# Patient Record
Sex: FEMALE | Race: BLACK OR AFRICAN AMERICAN | NOT HISPANIC OR LATINO | Employment: STUDENT | URBAN - METROPOLITAN AREA
[De-identification: names, ages, dates, MRNs, and addresses within clinical notes are randomized per-mention and may not be internally consistent; named-entity substitution may affect disease eponyms.]

---
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
## 2020-03-04 ENCOUNTER — OFFICE VISIT (OUTPATIENT)
Dept: CARDIOLOGY CLINIC | Facility: CLINIC | Age: 20
End: 2020-03-04
Payer: COMMERCIAL

## 2020-03-04 VITALS
SYSTOLIC BLOOD PRESSURE: 132 MMHG | DIASTOLIC BLOOD PRESSURE: 66 MMHG | OXYGEN SATURATION: 99 % | WEIGHT: 164 LBS | HEART RATE: 71 BPM | HEIGHT: 66 IN | BODY MASS INDEX: 26.36 KG/M2

## 2020-03-04 DIAGNOSIS — R07.2 PRECORDIAL CHEST PAIN: ICD-10-CM

## 2020-03-04 DIAGNOSIS — R00.2 PALPITATION: Primary | ICD-10-CM

## 2020-03-04 DIAGNOSIS — R06.00 DYSPNEA, UNSPECIFIED TYPE: ICD-10-CM

## 2020-03-04 PROCEDURE — 93000 ELECTROCARDIOGRAM COMPLETE: CPT | Performed by: INTERNAL MEDICINE

## 2020-03-04 PROCEDURE — 99204 OFFICE O/P NEW MOD 45 MIN: CPT | Performed by: INTERNAL MEDICINE

## 2020-03-04 PROCEDURE — 3008F BODY MASS INDEX DOCD: CPT | Performed by: INTERNAL MEDICINE

## 2020-03-04 PROCEDURE — 1036F TOBACCO NON-USER: CPT | Performed by: INTERNAL MEDICINE

## 2020-03-04 NOTE — PROGRESS NOTES
Cardiology Consultation     Mayda Sher  [id number]  2000  [facility] [address] [place] [address]    1  Palpitation  POCT ECG    Holter monitor - 24 hour    Echo complete with contrast if indicated    Stress test only, exercise   2  Dyspnea, unspecified type  POCT ECG    Holter monitor - 24 hour    Echo complete with contrast if indicated    Stress test only, exercise   3  Precordial chest pain  POCT ECG    Holter monitor - 24 hour    Echo complete with contrast if indicated    Stress test only, exercise       Chief Complaint:  Palpitation, chest pain, shortness of breath    HPI:  40-year-old female with asthma presented with complaints of palpitation, chest pain and shortness of breath  As per patient she was referred from Dr Kacey Avendaño but no referral noted  Patient is having intermittent palpitation associated with shortness of breath going on for almost a year  She has noted more palpitation during the time of stress  She denies any worsening of palpitation on exertion  Patient also has noted intermittent chest tightness going on for more than 6 months  She had episode of chest tightness before 2 weeks at rest which was constant and lasted for 1 day mostly at rest without associated shortness of breath or palpitation  No worsening of chest tightness on exertion  She complains of intermittent episode of shortness of breath more associated with palpitation without any associated wheezing and she describe as unable to take deep breaths  Patient denies fever, chills, diarrhea, or weight changes, orthopnea, leg edema or loss of consciousness  Patient did tell me that she was having palpitations since he was young and was told by pediatrician to keep a note of it but never required any intervention or medication    She denies any previous cardiac echocardiogram or stress test    Social History: Denies smoking, alcohol intake or illicit drug use  Family History:  No family history of coronary artery disease or sudden cardiac death  EKG today shows sinus rhythm, normal axis    No significant changes compared to EKG in June of 2018    Review of Systems:  Review of system negative except as mentioned above    Patient Active Problem List   Diagnosis    Chest pain    Extrinsic asthma    Acne    Mild intermittent asthma with acute exacerbation     Past Medical History:   Diagnosis Date    Allergic rhinitis     Last assessed: 11/11/14    Asthma     Intrinsic     Social History     Socioeconomic History    Marital status: Single     Spouse name: Not on file    Number of children: Not on file    Years of education: Currently in 12th grade    Highest education level: Not on file   Occupational History    Not on file   Social Needs    Financial resource strain: Not on file    Food insecurity:     Worry: Not on file     Inability: Not on file    Transportation needs:     Medical: Not on file     Non-medical: Not on file   Tobacco Use    Smoking status: Never Smoker    Smokeless tobacco: Never Used   Substance and Sexual Activity    Alcohol use: No    Drug use: No    Sexual activity: Never   Lifestyle    Physical activity:     Days per week: Not on file     Minutes per session: Not on file    Stress: Not on file   Relationships    Social connections:     Talks on phone: Not on file     Gets together: Not on file     Attends Taoism service: Not on file     Active member of club or organization: Not on file     Attends meetings of clubs or organizations: Not on file     Relationship status: Not on file    Intimate partner violence:     Fear of current or ex partner: Not on file     Emotionally abused: Not on file     Physically abused: Not on file     Forced sexual activity: Not on file   Other Topics Concern    Not on file   Social History Narrative    +smoke detectors    +carbon monoxide detectors    Pets: none    No tobacco exposure    No guns    Living w/parents      Family History   Problem Relation Age of Onset    Asthma Mother     Hypertension Mother    [facility] Migraines Mother     Asthma Father     Hypertension Father     Glaucoma Maternal Grandmother     Aneurysm Maternal Grandmother     Cataracts Maternal Grandmother         Acquired    Hypothyroidism Maternal Aunt     Addiction problem Neg Hx     Mental illness Neg Hx      Past Surgical History:   Procedure Laterality Date    NO PAST SURGERIES         Current Outpatient Medications:     albuterol (PROVENTIL HFA,VENTOLIN HFA) 90 mcg/act inhaler, Inhale 2 puffs every 6 (six) hours as needed for wheezing, Disp: , Rfl:     cetirizine (ZyrTEC) 10 mg tablet, Take 1 tablet (10 mg total) by mouth daily For nasal congestion, Disp: 30 tablet, Rfl: 2  Allergies   Allergen Reactions    Molds & Smuts Cough     Vitals:    03/04/20 1429   BP: 132/66   BP Location: Left arm   Patient Position: Sitting   Cuff Size: Standard   Pulse: 71   SpO2: 99%   Weight: 74 4 kg (164 lb)   Height: 5' 6" (1 676 m)       No imaging or labs available for review      Physical Exam:  General:   awake, alert and oriented x3, not in distress  Neck: supple, no JVD  Eyes: PERRL, conjunctiva normal  Lungs:  Bilateral air entry positive, no wheeze/rhonchi or crackle  Heart:  S1-S2 normal, no murmur  Abdomen:  Soft ,nondistended ,nontender, bowel sounds positive  Extremities:  No leg edema, no deformity, ROM normal  Neuro:  Moving all extremities, speech clear  Skin: warm, no rash    /66 (BP Location: Left arm, Patient Position: Sitting, Cuff Size: Standard)   Pulse 71   Ht 5' 6" (1 676 m)   Wt 74 4 kg (164 lb)   SpO2 99%   BMI 26 47 kg/m²       Cardiographics :  ECG:  Sinus rhythm, normal axis    Assessment:    1  Chest pain  Atypical in nature  EKG shows sinus rhythm, normal axis  No worsening of chest pain on exertion    2   Palpitation  Associated with feeling of shortness of breath  Patient denies previous syncope or dizziness    3  Shortness of breath    4  Asthma    Recommendations:  24 hour Holter monitoring to evaluate for arrhythmia  2D echocardiogram to evaluate for structural heart disease  Exercise treadmill stress test for further evaluation including evaluate for stress induced arrhythmia  Patient advised to continue her routine activities  Above all discussed with patient and mother in the clinic    Patient understands and agrees  Return to clinic in 6 months

## 2020-06-29 ENCOUNTER — TELEPHONE (OUTPATIENT)
Dept: CARDIOLOGY CLINIC | Facility: CLINIC | Age: 20
End: 2020-06-29

## 2020-07-02 ENCOUNTER — HOSPITAL ENCOUNTER (OUTPATIENT)
Dept: NON INVASIVE DIAGNOSTICS | Facility: CLINIC | Age: 20
Discharge: HOME | End: 2020-07-02
Payer: COMMERCIAL

## 2020-07-02 ENCOUNTER — TELEPHONE (OUTPATIENT)
Dept: CARDIOLOGY CLINIC | Facility: CLINIC | Age: 20
End: 2020-07-02

## 2020-07-02 DIAGNOSIS — R06.00 DYSPNEA, UNSPECIFIED TYPE: ICD-10-CM

## 2020-07-02 DIAGNOSIS — R00.2 PALPITATION: ICD-10-CM

## 2020-07-02 DIAGNOSIS — R07.2 PRECORDIAL CHEST PAIN: ICD-10-CM

## 2020-07-02 PROCEDURE — 93016 CV STRESS TEST SUPVJ ONLY: CPT | Performed by: INTERNAL MEDICINE

## 2020-07-02 PROCEDURE — 93226 XTRNL ECG REC<48 HR SCAN A/R: CPT

## 2020-07-02 PROCEDURE — 93306 TTE W/DOPPLER COMPLETE: CPT | Performed by: INTERNAL MEDICINE

## 2020-07-02 PROCEDURE — 93225 XTRNL ECG REC<48 HRS REC: CPT

## 2020-07-02 PROCEDURE — 93018 CV STRESS TEST I&R ONLY: CPT | Performed by: INTERNAL MEDICINE

## 2020-07-02 PROCEDURE — 93017 CV STRESS TEST TRACING ONLY: CPT

## 2020-07-02 PROCEDURE — 93306 TTE W/DOPPLER COMPLETE: CPT

## 2020-07-02 NOTE — TELEPHONE ENCOUNTER
----- Message from Arely Otto MD sent at 7/2/2020  3:18 PM EDT -----  Please call the patient and inform her that there is no significant finding on echo

## 2020-07-06 LAB
ARRHY DURING EX: NORMAL
CHEST PAIN STATEMENT: NORMAL
MAX DIASTOLIC BP: 60 MMHG
MAX HEART RATE: 184 BPM
MAX PREDICTED HEART RATE: 200 BPM
MAX. SYSTOLIC BP: 164 MMHG
PROTOCOL NAME: NORMAL
REASON FOR TERMINATION: NORMAL
TARGET HR FORMULA: NORMAL
TEST INDICATION: NORMAL
TIME IN EXERCISE PHASE: NORMAL

## 2020-07-06 PROCEDURE — 93227 XTRNL ECG REC<48 HR R&I: CPT | Performed by: INTERNAL MEDICINE

## 2020-07-08 ENCOUNTER — TELEPHONE (OUTPATIENT)
Dept: CARDIOLOGY CLINIC | Facility: CLINIC | Age: 20
End: 2020-07-08

## 2020-07-08 NOTE — TELEPHONE ENCOUNTER
Was able to reach patient at [phone]  Advised her that there were no significant findings on her stress,holter or echo  Pt verbally understood  Pt is to return in 6 mths

## 2020-07-08 NOTE — TELEPHONE ENCOUNTER
----- Message from Nikhil Ellington MD sent at 7/7/2020  5:30 PM EDT -----  I called patient to discuss test result but unable to reach our after multiple times  please call the patient and inform her that there is no significant finding on echo, stress test or Holter  Let me know if she has any other question    She should follow-up as per schedule

## 2020-07-28 ENCOUNTER — TELEPHONE (OUTPATIENT)
Dept: CARDIOLOGY CLINIC | Facility: CLINIC | Age: 20
End: 2020-07-28

## 2020-07-30 NOTE — TELEPHONE ENCOUNTER
Spoke with patient  She had a 24 hour heart monitor on and when she removed it on 07/03/2020, she had some marks where the pads were  They have since scabbed over and she is left with scars and some dark skin in the area  Pt is looking for advice for the haider   (Dr [facility] patient)

## 2020-07-31 NOTE — TELEPHONE ENCOUNTER
If it is itchy, could be related to possible allergy to the patches  She can try over-the-counter hydrocortisone  If no improvement, patient should have this looked at by her primary care physician

## 2020-08-27 ENCOUNTER — OFFICE VISIT (OUTPATIENT)
Dept: CARDIOLOGY CLINIC | Facility: CLINIC | Age: 20
End: 2020-08-27
Payer: COMMERCIAL

## 2020-08-27 VITALS
OXYGEN SATURATION: 99 % | TEMPERATURE: 97.4 F | BODY MASS INDEX: 25.88 KG/M2 | DIASTOLIC BLOOD PRESSURE: 70 MMHG | WEIGHT: 161 LBS | RESPIRATION RATE: 18 BRPM | SYSTOLIC BLOOD PRESSURE: 110 MMHG | HEIGHT: 66 IN | HEART RATE: 83 BPM

## 2020-08-27 DIAGNOSIS — R07.89 ATYPICAL CHEST PAIN: Primary | ICD-10-CM

## 2020-08-27 PROCEDURE — 99212 OFFICE O/P EST SF 10 MIN: CPT | Performed by: INTERNAL MEDICINE

## 2020-08-27 NOTE — PROGRESS NOTES
[facility] [place]  [id number] [facility] [place] [address]  Cardiology Follow Up    Fallon Lambert  2000  [id number]      1  Palpitation  TSH, 3rd generation with Free T4 reflex       Chief Complaint   Patient presents with    Follow-up     6 months        Interval History:   51-year-old female with asthma presented for follow-up and test result discussion  Patient was initially seen in Cardiology Clinic for evaluation of chest pain, palpitation and shortness of breath  Since last clinic visit patient shortness of breath and palpitation has resolved  Her chest pain are much better  She had an episode of atypical chest pain early this month at rest lasting few seconds without any associated symptoms and self-resolved  Patient denies chest pain or shortness of breath on exertion  Her chest pain are always at rest   She does exercise at home and denies any chest pain during exercise  Denies fever, chills, leg edema or loss of consciousness  Cardiac workup in July of 2020:  24 hour Holter monitoring - predominant sinus rhythm with rare supraventricular and rare ventricular ectopic activity  No significant arrhythmia finding    Echocardiogram:  EF 60% without regional wall motion abnormality  No evidence of structural heart disease    Exercise treadmill stress test:  Exercise time 8 minutes  METS achieved 10 1  Maximum predicted heart rate 92%  Stress EKG negative for ischemia    No exercise-induced arrhythmia       Review of Systems:  Review of system negative except as mentioned above    Patient Active Problem List   Diagnosis    Chest pain    Extrinsic asthma    Acne    Mild intermittent asthma with acute exacerbation     Past Medical History:   Diagnosis Date    Allergic rhinitis     Last assessed: 11/11/14    Asthma     Intrinsic     Social History     Socioeconomic History    Marital status: Single     Spouse name: Not on file    Number of children: Not on file    Years of education: Currently in 12th grade    Highest education level: Not on file   Occupational History    Not on file   Social Needs    Financial resource strain: Not on file    Food insecurity     Worry: Not on file     Inability: Not on file    Transportation needs     Medical: Not on file     Non-medical: Not on file   Tobacco Use    Smoking status: Never Smoker    Smokeless tobacco: Never Used   Substance and Sexual Activity    Alcohol use: No    Drug use: No    Sexual activity: Never   Lifestyle    Physical activity     Days per week: Not on file     Minutes per session: Not on file    Stress: Not on file   Relationships    Social connections     Talks on phone: Not on file     Gets together: Not on file     Attends Jain service: Not on file     Active member of club or organization: Not on file     Attends meetings of clubs or organizations: Not on file     Relationship status: Not on file    Intimate partner violence     Fear of current or ex partner: Not on file     Emotionally abused: Not on file     Physically abused: Not on file     Forced sexual activity: Not on file   Other Topics Concern    Not on file   Social History Narrative    +smoke detectors    +carbon monoxide detectors    Pets: none    No tobacco exposure    No guns    Living w/parents      Family History   Problem Relation Age of Onset    Asthma Mother     Hypertension Mother     Migraines Mother     Asthma Father     Hypertension Father     Glaucoma Maternal Grandmother     Aneurysm Maternal Grandmother     Cataracts Maternal Grandmother         Acquired    Hypothyroidism Maternal Aunt     Addiction problem Neg Hx     Mental illness Neg Hx      Past Surgical History:   Procedure Laterality Date    NO PAST SURGERIES         Current Outpatient Medications:     albuterol (PROVENTIL HFA,VENTOLIN HFA) 90 mcg/act inhaler, Inhale 2 puffs every 6 (six) hours as needed for wheezing, Disp: , Rfl:     cetirizine (ZyrTEC) 10 mg tablet, Take 1 tablet (10 mg total) by mouth daily For nasal congestion (Patient not taking: Reported on 8/27/2020), Disp: 30 tablet, Rfl: 2  Allergies   Allergen Reactions    Molds & Smuts Cough       Labs:  Hospital Outpatient Visit on 07/02/2020   Component Date Value    Protocol Name 07/02/2020 SETH     Time In Exercise Phase 07/02/2020 00:08:00     MAX  SYSTOLIC BP 05/62/5942 327     Max Diastolic Bp 06/74/6384 60     Max Heart Rate 07/02/2020 184     Max Predicted Heart Rate 07/02/2020 200     Reason for Termination 07/02/2020 Target Heart Rate Achieved     Test Indication 07/02/2020 DYSPNEA, PRECORDIAL PAIN     Target Hr Formular 07/02/2020 (220 - Age)*85%     Arrhy During Ex 07/02/2020 none     Chest Pain Statement 07/02/2020 none      Imaging: No results found  Physical Exam:  General:  moderate built, awake, alert and oriented x3, not in distress  Neck: supple, no JVD  Eyes: PERRL, conjunctiva normal  Lungs:  Bilateral air entry positive, no wheeze/rhonchi or crackle  Heart:  S1-S2 normal, no murmur  Abdomen:  Soft ,nondistended ,nontender, bowel sounds positive  Extremities:  No leg edema, no deformity, ROM normal  Neuro:  Moving all extremities, speech clear  Skin: warm, no rash    /70 (BP Location: Right arm, Patient Position: Sitting, Cuff Size: Standard)   Pulse 83   Temp (!) 97 4 °F (36 3 °C)   Resp 18   Ht 5' 6" (1 676 m)   Wt 73 kg (161 lb)   SpO2 99%   BMI 25 99 kg/m²         Assessment:    1  Chest pain  Non anginal in nature  Echo showed normal LVEF without regional wall motion abnormality  Stress exercise treadmill stress test negative for ischemic changes  No chest pain on exertion  2  Palpitations/shortness of breath  Better since last admission and has resolved    3  Asthma    Recommendations:  Clinically it appears that chest pain are noncardiac in nature  There is no significant finding on cardiac work workup     I would get TSH level  Patient to follow-up with primary care physician   Advised to continue exercise  Return to clinic in 6 months or early as needed  Above all discussed with patient    Patient understands and agrees

## 2020-12-15 ENCOUNTER — TRANSCRIBE ORDERS (OUTPATIENT)
Dept: ADMINISTRATIVE | Facility: HOSPITAL | Age: 20
End: 2020-12-15

## 2020-12-15 ENCOUNTER — LAB (OUTPATIENT)
Dept: LAB | Facility: HOSPITAL | Age: 20
End: 2020-12-15
Attending: INTERNAL MEDICINE
Payer: COMMERCIAL

## 2020-12-15 ENCOUNTER — TELEPHONE (OUTPATIENT)
Dept: CARDIOLOGY CLINIC | Facility: CLINIC | Age: 20
End: 2020-12-15

## 2020-12-15 DIAGNOSIS — R00.2 PALPITATIONS: Primary | ICD-10-CM

## 2020-12-15 DIAGNOSIS — R00.2 PALPITATIONS: ICD-10-CM

## 2020-12-15 LAB
ALBUMIN SERPL BCP-MCNC: 3.7 G/DL (ref 3.5–5)
ALP SERPL-CCNC: 48 U/L (ref 46–116)
ALT SERPL W P-5'-P-CCNC: 13 U/L (ref 12–78)
ANION GAP SERPL CALCULATED.3IONS-SCNC: 9 MMOL/L (ref 4–13)
AST SERPL W P-5'-P-CCNC: 16 U/L (ref 5–45)
BILIRUB SERPL-MCNC: 0.2 MG/DL (ref 0.2–1)
BUN SERPL-MCNC: 9 MG/DL (ref 5–25)
CALCIUM SERPL-MCNC: 8.7 MG/DL (ref 8.3–10.1)
CHLORIDE SERPL-SCNC: 105 MMOL/L (ref 100–108)
CHOLEST SERPL-MCNC: 125 MG/DL (ref 50–200)
CO2 SERPL-SCNC: 26 MMOL/L (ref 21–32)
CREAT SERPL-MCNC: 0.77 MG/DL (ref 0.6–1.3)
GFR SERPL CREATININE-BSD FRML MDRD: 129 ML/MIN/1.73SQ M
GLUCOSE P FAST SERPL-MCNC: 86 MG/DL (ref 65–99)
HDLC SERPL-MCNC: 55 MG/DL
LDLC SERPL CALC-MCNC: 59 MG/DL (ref 0–100)
NONHDLC SERPL-MCNC: 70 MG/DL
POTASSIUM SERPL-SCNC: 4.1 MMOL/L (ref 3.5–5.3)
PROT SERPL-MCNC: 7.7 G/DL (ref 6.4–8.2)
SODIUM SERPL-SCNC: 140 MMOL/L (ref 136–145)
TRIGL SERPL-MCNC: 57 MG/DL
TSH SERPL DL<=0.05 MIU/L-ACNC: 1.25 UIU/ML (ref 0.46–3.98)

## 2020-12-15 PROCEDURE — 80061 LIPID PANEL: CPT

## 2020-12-15 PROCEDURE — 80053 COMPREHEN METABOLIC PANEL: CPT

## 2020-12-15 PROCEDURE — 84443 ASSAY THYROID STIM HORMONE: CPT | Performed by: INTERNAL MEDICINE

## 2020-12-15 PROCEDURE — 36415 COLL VENOUS BLD VENIPUNCTURE: CPT

## 2021-03-16 ENCOUNTER — OFFICE VISIT (OUTPATIENT)
Dept: CARDIOLOGY CLINIC | Facility: CLINIC | Age: 21
End: 2021-03-16
Payer: COMMERCIAL

## 2021-03-16 VITALS
DIASTOLIC BLOOD PRESSURE: 68 MMHG | BODY MASS INDEX: 25.17 KG/M2 | HEIGHT: 66 IN | SYSTOLIC BLOOD PRESSURE: 110 MMHG | OXYGEN SATURATION: 98 % | HEART RATE: 77 BPM | WEIGHT: 156.6 LBS

## 2021-03-16 DIAGNOSIS — R00.2 PALPITATION: ICD-10-CM

## 2021-03-16 DIAGNOSIS — R07.89 CHEST PAIN, ATYPICAL: Primary | ICD-10-CM

## 2021-03-16 PROCEDURE — 99213 OFFICE O/P EST LOW 20 MIN: CPT | Performed by: INTERNAL MEDICINE

## 2021-03-16 RX ORDER — NAPROXEN 500 MG/1
500 TABLET ORAL 2 TIMES DAILY
COMMUNITY

## 2021-03-16 NOTE — PROGRESS NOTES
[facility] [place]  [id number] [place]  [place] [address]  Cardiology Follow Up    Collin Mention  2000  [id number]      1  Chest pain, atypical     2  Palpitation         Chief Complaint   Patient presents with    Follow-up     6 month follow up       Interval History:    51-year-old female  with asthma presented for cardiology follow-up     patient was seen in Cardiology Clinic for non anginal chest pain including palpitation   since last clinic visit patient is doing well  Her palpitation has resolved  She had few episodes of non anginal chest pain without any associated symptoms at rest lasting few seconds but better than before  She was diagnosed to have COVID-19 December 2020 which did not require any hospitalization or antibiotic treatment   she has recently started doing exercise which includes cardio and patient able to do without any chest pain or shortness of breath  she is currently not taking any medications   lipid panel from December 2020 reviewed    LDL 59    Review of Systems:   all review of system negative except as mentioned above    Patient Active Problem List   Diagnosis    Chest pain    Extrinsic asthma    Acne    Mild intermittent asthma with acute exacerbation     Past Medical History:   Diagnosis Date    Allergic rhinitis     Last assessed: 11/11/14    Asthma     Intrinsic     Social History     Socioeconomic History    Marital status: Single     Spouse name: Not on file    Number of children: Not on file    Years of education: Currently in 12th grade    Highest education level: Not on file   Occupational History    Not on file   Social Needs    Financial resource strain: Not on file    Food insecurity     Worry: Not on file     Inability: Not on file    Transportation needs     Medical: Not on file     Non-medical: Not on file   Tobacco Use    Smoking status: Never Smoker    Smokeless tobacco: Never Used   Substance and Sexual Activity    Alcohol use: No    Drug use: No    Sexual activity: Never   Lifestyle    Physical activity     Days per week: Not on file     Minutes per session: Not on file    Stress: Not on file   Relationships    Social connections     Talks on phone: Not on file     Gets together: Not on file     Attends Muslim service: Not on file     Active member of club or organization: Not on file     Attends meetings of clubs or organizations: Not on file     Relationship status: Not on file    Intimate partner violence     Fear of current or ex partner: Not on file     Emotionally abused: Not on file     Physically abused: Not on file     Forced sexual activity: Not on file   Other Topics Concern    Not on file   Social History Narrative    +smoke detectors    +carbon monoxide detectors    Pets: none    No tobacco exposure    No guns    Living w/parents      Family History   Problem Relation Age of Onset    Asthma Mother     Hypertension Mother     Migraines Mother     Asthma Father     Hypertension Father     Glaucoma Maternal Grandmother     Aneurysm Maternal Grandmother     Cataracts Maternal Grandmother         Acquired    Hypothyroidism Maternal Aunt     Addiction problem Neg Hx     Mental illness Neg Hx      Past Surgical History:   Procedure Laterality Date    NO PAST SURGERIES         Current Outpatient Medications:     albuterol (PROVENTIL HFA,VENTOLIN HFA) 90 mcg/act inhaler, Inhale 2 puffs every 6 (six) hours as needed for wheezing, Disp: , Rfl:     naproxen (NAPROSYN) 500 mg tablet, Take 500 mg by mouth 2 (two) times a day, Disp: , Rfl:     cetirizine (ZyrTEC) 10 mg tablet, Take 1 tablet (10 mg total) by mouth daily For nasal congestion (Patient not taking: Reported on 8/27/2020), Disp: 30 tablet, Rfl: 2  Allergies   Allergen Reactions    Molds & Smuts Cough       Labs:  Lab on 12/15/2020   Component Date Value    Sodium 12/15/2020 140     Potassium 12/15/2020 4 1     Chloride 12/15/2020 105     CO2 12/15/2020 26     ANION GAP 12/15/2020 9     BUN 12/15/2020 9     Creatinine 12/15/2020 0 77     Glucose, Fasting 12/15/2020 86     Calcium 12/15/2020 8 7     AST 12/15/2020 16     ALT 12/15/2020 13     Alkaline Phosphatase 12/15/2020 48     Total Protein 12/15/2020 7 7     Albumin 12/15/2020 3 7     Total Bilirubin 12/15/2020 0 20     eGFR 12/15/2020 129     Cholesterol 12/15/2020 125     Triglycerides 12/15/2020 57     HDL, Direct 12/15/2020 55     LDL Calculated 12/15/2020 59     Non-HDL-Chol (CHOL-HDL) 12/15/2020 70      Imaging: No results found  Physical Exam:  General:   average built, awake, alert and oriented x3, not in distress  Neck: supple, no JVD  Eyes: PERRL, conjunctiva normal  Lungs:  Bilateral air entry positive, no wheeze/rhonchi or crackle  Heart:  S1-S2 normal, no murmur  Abdomen:  Soft ,nondistended ,nontender, bowel sounds positive  Extremities:  No leg edema, no deformity, ROM normal  Neuro:  Moving all extremities, speech clear  Skin: warm, no rash    /68 (BP Location: Left arm, Patient Position: Sitting, Cuff Size: Standard)   Pulse 77   Ht 5' 6" (1 676 m)   Wt 71 kg (156 lb 9 6 oz)   SpO2 98%   BMI 25 28 kg/m²     Cardiographics :    24 hour Holter monitoring - predominant sinus rhythm with rare supraventricular and rare ventricular ectopic activity  No significant arrhythmia finding     Echocardiogram:  EF 60% without regional wall motion abnormality  No evidence of structural heart disease     Exercise treadmill stress test:  Exercise time 8 minutes  METS achieved 10 1  Maximum predicted heart rate 92%  Stress EKG negative for ischemia  No exercise-induced arrhythmia          Assessment:    1  Non anginal chest pain  2  Palpitation  Has resolved  TSH was within normal limits  3  Asthma  4  COVID-19 in December 2020    Recommendations:     patient is doing okay from cardiology standpoint at present time    Her palpitation has resolved and she had non anginal chest pain which are noncardiac in origin lasting few seconds  She is able to do cardio exercise without any chest pain or shortness of breath     patient advised to continue current exercise    she was advised to call us back early if she gets chest pain or shortness of breath  interferes with her exercise or as needed  Return to clinic in 1 year or early if needed   Above all discussed with patient    Patient understands and agrees

## 2021-03-30 DIAGNOSIS — Z23 ENCOUNTER FOR IMMUNIZATION: ICD-10-CM

## 2022-05-25 ENCOUNTER — OFFICE VISIT (OUTPATIENT)
Dept: CARDIOLOGY CLINIC | Facility: CLINIC | Age: 22
End: 2022-05-25
Payer: COMMERCIAL

## 2022-05-25 VITALS
DIASTOLIC BLOOD PRESSURE: 72 MMHG | HEIGHT: 66 IN | RESPIRATION RATE: 14 BRPM | HEART RATE: 86 BPM | BODY MASS INDEX: 24.11 KG/M2 | WEIGHT: 150 LBS | SYSTOLIC BLOOD PRESSURE: 118 MMHG | OXYGEN SATURATION: 98 %

## 2022-05-25 DIAGNOSIS — R00.2 PALPITATION: ICD-10-CM

## 2022-05-25 DIAGNOSIS — R07.9 CHEST PAIN, UNSPECIFIED TYPE: Primary | ICD-10-CM

## 2022-05-25 PROCEDURE — 99213 OFFICE O/P EST LOW 20 MIN: CPT | Performed by: NURSE PRACTITIONER

## 2022-05-25 NOTE — PROGRESS NOTES
Cardiology Office Note    Karen Kitchen 25 y o  female MRN: [id number]    05/25/22          Assessment/Plan:    1  Chest pain  -occurred a few times in the last year, has been occurring for years  -could be GI or muscle related as she was diagnosed shortly after with gastric ulcer   -instructed to notify the office if she has recurrent chest pain or other symptoms out of the ordinary    2  Palpitations  -currently resolved  -patient instructed to report if they recur      Follow up:  1 year or sooner as needed    1  Chest pain, unspecified type     2  Palpitation         HPI: Karen Kitchen is a 25y o  year old female with a past medical history of asthma, COVID 19 and and palpitations who presents today for routine follow-up  She was last seen in this office in March of 2021 at that time had reported that the palpitations she had been experiencing were resolved, and she stated that she was still experiencing chest pain but only lasted a few seconds  At that time she reported that she does do cardio exercise and during she does not experience chest pain  Today she states that she did have 2 occurrences of chest pain in the last year  She states she has been experiencing chest pain intermittently since she was a child  She states that 1 episode she had chest pain all day, without any relieving or exacerbating factors and that it went away on its own  She also states that a few weeks later she was diagnosed with a gastric ulcer, which could explain her pain  Her last ischemic evaluation was performed in July of 2020 via exercise stress testing which was negative for ischemia  She also completed Holter monitoring at that time which showed mostly sinus rhythm with minimal ectopy and an average heart rate of 82, and a TTE also performed at that time revealed normal systolic function with an EF of 60% and no regional wall motion abnormalities      She denies chest pain, dyspnea on exertion or rest, lower extremity edema, or palpitations and was instructed to call  the office or seek medical attention if any such symptoms develop  All medications reviewed and patient is tolerating medications without side effects         Patient Active Problem List   Diagnosis    Chest pain    Extrinsic asthma    Acne    Mild intermittent asthma with acute exacerbation       Allergies   Allergen Reactions    Molds & Smuts Cough         Current Outpatient Medications:     albuterol (PROVENTIL HFA,VENTOLIN HFA) 90 mcg/act inhaler, Inhale 2 puffs every 6 (six) hours as needed for wheezing, Disp: , Rfl:     cetirizine (ZyrTEC) 10 mg tablet, Take 1 tablet (10 mg total) by mouth daily For nasal congestion (Patient not taking: Reported on 8/27/2020), Disp: 30 tablet, Rfl: 2    naproxen (NAPROSYN) 500 mg tablet, Take 500 mg by mouth 2 (two) times a day, Disp: , Rfl:     Past Medical History:   Diagnosis Date    Allergic rhinitis     Last assessed: 11/11/14    Asthma     Intrinsic       Family History   Problem Relation Age of Onset    Asthma Mother     Hypertension Mother     Migraines Mother     Asthma Father     Hypertension Father     Glaucoma Maternal Grandmother     Aneurysm Maternal Grandmother     Cataracts Maternal Grandmother         Acquired    Hypothyroidism Maternal Aunt     Addiction problem Neg Hx     Mental illness Neg Hx        Past Surgical History:   Procedure Laterality Date    NO PAST SURGERIES         Social History     Socioeconomic History    Marital status: Single     Spouse name: Not on file    Number of children: Not on file    Years of education: Currently in 12th grade    Highest education level: Not on file   Occupational History    Not on file   Tobacco Use    Smoking status: Never Smoker    Smokeless tobacco: Never Used   Vaping Use    Vaping Use: Never used   Substance and Sexual Activity    Alcohol use: No    Drug use: No    Sexual activity: Never   Other Topics Concern  Not on file   Social History Narrative    +smoke detectors    +carbon monoxide detectors    Pets: none    No tobacco exposure    No guns    Living w/parents     Social Determinants of Health     Financial Resource Strain: Not on file   Food Insecurity: Not on file   Transportation Needs: Not on file   Physical Activity: Not on file   Stress: Not on file   Social Connections: Not on file   Intimate Partner Violence: Not on file   Housing Stability: Not on file       Review of symptoms:   Constitution:  Negative  HEENT:  Negative  Cardiovascular:  Negative  Respiratory:  Negative  Skin:  Negative  Gastrointestinal:  Negative  Genitourinary:  Negative  Musculoskeletal:  Negative  Neurological:  Negative  Endocrine:  Negative  Psychological:  Negative    Vitals: There were no vitals taken for this visit  Physical Exam:     GEN: Alert and oriented x 3, in no acute distress  Well appearing and well nourished  HEENT: Sclera anicteric, conjunctivae pink, mucous membranes moist    NECK: Supple, no carotid bruits, no significant JVD  Trachea midline  HEART: Regular rhythm, normal S1 and S2, no murmurs, clicks, gallops or rubs  LUNGS: Clear to auscultation bilaterally; no wheezes, rales, or rhonchi  No increased work of breathing or signs of respiratory distress  ABDOMEN: Soft, nontender, nondistended, normoactive bowel sounds  EXTREMITIES: Skin warm and well perfused, no clubbing, cyanosis, or edema  NEURO: No focal findings  Normal speech  Mood and affect normal    SKIN: Normal without suspicious lesions on exposed skin

## 2022-12-01 ENCOUNTER — OFFICE VISIT (OUTPATIENT)
Dept: CARDIOLOGY CLINIC | Facility: CLINIC | Age: 22
End: 2022-12-01

## 2022-12-01 VITALS
HEART RATE: 84 BPM | HEIGHT: 66 IN | SYSTOLIC BLOOD PRESSURE: 114 MMHG | DIASTOLIC BLOOD PRESSURE: 82 MMHG | BODY MASS INDEX: 26.84 KG/M2 | WEIGHT: 167 LBS | OXYGEN SATURATION: 97 % | RESPIRATION RATE: 16 BRPM

## 2022-12-01 DIAGNOSIS — R00.2 HEART PALPITATIONS: ICD-10-CM

## 2022-12-01 DIAGNOSIS — J45.20 MILD INTERMITTENT EXTRINSIC ASTHMA WITHOUT COMPLICATION: ICD-10-CM

## 2022-12-01 DIAGNOSIS — R07.9 CHEST PAIN IN ADULT: Primary | ICD-10-CM

## 2022-12-01 NOTE — PROGRESS NOTES
Cardiology Follow Up    Zoey Romero  2000  [id number]  [facility] [address]  [facility] [address] [phone]        Interval History:  Zoey Romero is a 25y o  year old female with a past medical history of asthma, COVID 19 and and palpitations who presents today for routine follow-up  About 2 weeks ago she was laying in bed and developed a severe midsternal chest pain at rest in bed, like being punched in the chest  or a squeezing of her heart lasted about 10 minutes  No other episodes since then but they do occur on and off   She works at [employer]      Patient Active Problem List   Diagnosis   • Chest pain   • Extrinsic asthma   • Acne   • Mild intermittent asthma with acute exacerbation     Past Medical History:   Diagnosis Date   • Allergic rhinitis     Last assessed: 11/11/14   • Asthma     Intrinsic     Social History     Socioeconomic History   • Marital status: Single     Spouse name: Not on file   • Number of children: Not on file   • Years of education: Currently in 12th grade   • Highest education level: Not on file   Occupational History   • Not on file   Tobacco Use   • Smoking status: Never   • Smokeless tobacco: Never   Vaping Use   • Vaping Use: Never used   Substance and Sexual Activity   • Alcohol use: No   • Drug use: No   • Sexual activity: Never   Other Topics Concern   • Not on file   Social History Narrative    +smoke detectors    +carbon monoxide detectors    Pets: none    No tobacco exposure    No guns    Living w/parents     Social Determinants of Health     Financial Resource Strain: Not on file   Food Insecurity: Not on file   Transportation Needs: Not on file   Physical Activity: Not on file   Stress: Not on file   Social Connections: Not on file   Intimate Partner Violence: Not on file   Housing Stability: Not on file      Family History   Problem Relation Age of Onset   • Asthma Mother    • Hypertension Mother    • Migraines Mother    • Asthma Father    • Hypertension Father    • Glaucoma Maternal Grandmother    • Aneurysm Maternal Grandmother    • Cataracts Maternal Grandmother         Acquired   • Hypothyroidism Maternal Aunt    • Addiction problem Neg Hx    • Mental illness Neg Hx      Past Surgical History:   Procedure Laterality Date   • NO PAST SURGERIES         Current Outpatient Medications:   •  albuterol (PROVENTIL HFA,VENTOLIN HFA) 90 mcg/act inhaler, Inhale 2 puffs every 6 (six) hours as needed for wheezing, Disp: , Rfl:   •  cetirizine (ZyrTEC) 10 mg tablet, Take 1 tablet (10 mg total) by mouth daily For nasal congestion, Disp: 30 tablet, Rfl: 2  •  naproxen (NAPROSYN) 500 mg tablet, Take 500 mg by mouth 2 (two) times a day, Disp: , Rfl:   Allergies   Allergen Reactions   • Molds & Smuts Cough   • Shrimp (Diagnostic) - Food Allergy Other (See Comments)     Unknown        Labs:  No visits with results within 2 Month(s) from this visit  Latest known visit with results is:   Lab on 12/15/2020   Component Date Value   • Sodium 12/15/2020 140    • Potassium 12/15/2020 4 1    • Chloride 12/15/2020 105    • CO2 12/15/2020 26    • ANION GAP 12/15/2020 9    • BUN 12/15/2020 9    • Creatinine 12/15/2020 0 77    • Glucose, Fasting 12/15/2020 86    • Calcium 12/15/2020 8 7    • AST 12/15/2020 16    • ALT 12/15/2020 13    • Alkaline Phosphatase 12/15/2020 48    • Total Protein 12/15/2020 7 7    • Albumin 12/15/2020 3 7    • Total Bilirubin 12/15/2020 0 20    • eGFR 12/15/2020 129    • Cholesterol 12/15/2020 125    • Triglycerides 12/15/2020 57    • HDL, Direct 12/15/2020 55    • LDL Calculated 12/15/2020 59    • Non-HDL-Chol (CHOL-HDL) 12/15/2020 70      Imaging: No results found  Review of Systems:  Review of Systems Constitutional:  No fever or chills  Cardiac:  No chest pain, shortness of breath  Respiratory:     No coughing, hemoptysis, wheezing  Abdominal:  No nausea, vomiting, abdominal discomfort  urinary:  No hematuria  Extremities:  No swelling nor edema  Physical Exam:  Physical Exam HEENT:  Unremarkable  No JVD  Lungs:  Clear  Cardiac:  Regular rate and rhythm with no murmurs rubs nor gallops  Abdomen:  Soft, nontender, no rebound, normal bowel sounds  Extremities:  No clubbing cyanosis nor edema  Neuro:  Grossly nonfocal   Psych:  Alert and oriented x3    Holter monitor July 2, 2020: CONCLUSIONS:  Sinus rhythm with minimal ectopic activity as detailed above  Two episodes of chest pain while lying in bed had underlying sinus rates of 82 and 106 bpm  One episode of "heart speeds up" had underlying sinus rhythm @ 81 bpm      Echo July 2, 2020:SUMMARY  LEFT VENTRICLE:  Systolic function was normal  Ejection fraction was estimated to be 60 %  There were no regional wall motion abnormalities  Exercise EKG stress test July 2, 2020:  IMPRESSIONS: Normal study at 10 Mets and 92% max predicted heart rate  Nonspecific T-wave inversions resolved              1  Chest pain in adult  POCT ECG      2  Heart palpitations  POCT ECG      3  Mild intermittent extrinsic asthma without complication  POCT ECG          Discussion/Summary:Roselia has chest discomforts as described  We reviewed her previous testing which was over 2 years ago  I would like to order a stress echocardiogram to assess LV function and look for ischemia  If that is negative I am fairly confident it is noncardiac chest pain  However we did discuss next possible steps which would include possible coronary CTA or cardiac catheterization  Her last LDL was excellent at 59  Continue treatment of asthma  We discussed noncardiac causes of chest pain

## 2023-01-05 ENCOUNTER — DOCUMENTATION (OUTPATIENT)
Dept: PAIN MEDICINE | Facility: CLINIC | Age: 23
End: 2023-01-05

## 2023-01-05 ENCOUNTER — HOSPITAL ENCOUNTER (OUTPATIENT)
Dept: NON INVASIVE DIAGNOSTICS | Facility: CLINIC | Age: 23
Discharge: HOME | End: 2023-01-05

## 2023-01-05 VITALS
HEIGHT: 66 IN | WEIGHT: 167 LBS | DIASTOLIC BLOOD PRESSURE: 82 MMHG | SYSTOLIC BLOOD PRESSURE: 136 MMHG | BODY MASS INDEX: 26.84 KG/M2 | HEART RATE: 77 BPM | OXYGEN SATURATION: 100 %

## 2023-01-05 DIAGNOSIS — R07.9 CHEST PAIN IN ADULT: ICD-10-CM

## 2023-01-05 DIAGNOSIS — R00.2 HEART PALPITATIONS: ICD-10-CM

## 2023-01-05 DIAGNOSIS — J45.20 MILD INTERMITTENT EXTRINSIC ASTHMA WITHOUT COMPLICATION: ICD-10-CM

## 2023-01-05 LAB
CHEST PAIN STATEMENT: NORMAL
MAX DIASTOLIC BP: 102 MMHG
MAX HEART RATE: 184 BPM
MAX HR PERCENT: 92 %
MAX HR: 184 BPM
MAX PREDICTED HEART RATE: 198 BPM
MAX. SYSTOLIC BP: 188 MMHG
PROTOCOL NAME: NORMAL
RATE PRESSURE PRODUCT: NORMAL
SL CV STRESS RECOVERY BP: NORMAL MMHG
SL CV STRESS RECOVERY HR: 103 BPM
SL CV STRESS RECOVERY O2 SAT: 99 %
SL CV STRESS STAGE REACHED: 3
STRESS ANGINA INDEX: 0
STRESS BASELINE BP: NORMAL MMHG
STRESS BASELINE HR: 77 BPM
STRESS O2 SAT REST: 100 %
STRESS PEAK HR: 184 BPM
STRESS POST ESTIMATED WORKLOAD: 10.1 METS
STRESS POST EXERCISE DUR MIN: 9 MIN
STRESS POST EXERCISE DUR SEC: 1 SEC
STRESS POST O2 SAT PEAK: 98 %
STRESS POST PEAK BP: 188 MMHG
TARGET HR FORMULA: NORMAL
TEST INDICATION: NORMAL
TIME IN EXERCISE PHASE: NORMAL

## 2023-01-09 LAB
CHEST PAIN STATEMENT: NORMAL
CHEST PAIN STATEMENT: NORMAL
MAX DIASTOLIC BP: 102 MMHG
MAX DIASTOLIC BP: 102 MMHG
MAX HEART RATE: 184 BPM
MAX HEART RATE: 184 BPM
MAX PREDICTED HEART RATE: 198 BPM
MAX PREDICTED HEART RATE: 198 BPM
MAX. SYSTOLIC BP: 188 MMHG
MAX. SYSTOLIC BP: 188 MMHG
PROTOCOL NAME: NORMAL
PROTOCOL NAME: NORMAL
TARGET HR FORMULA: NORMAL
TARGET HR FORMULA: NORMAL
TEST INDICATION: NORMAL
TEST INDICATION: NORMAL
TIME IN EXERCISE PHASE: NORMAL
TIME IN EXERCISE PHASE: NORMAL

## 2023-01-12 ENCOUNTER — TELEPHONE (OUTPATIENT)
Dept: CARDIOLOGY CLINIC | Facility: CLINIC | Age: 23
End: 2023-01-12

## 2023-01-12 NOTE — TELEPHONE ENCOUNTER
----- Message from Jose Carlos Phillips MD sent at 1/12/2023 12:38 PM EST -----  Please call the patient and tell her stress echocardiogram is normal, no evidence for blockage    ----- Message -----  From: Lucie Johnson Results In  Sent: 1/9/2023   8:02 AM EST  To: Jose Carlos Phillips MD

## 2023-07-09 ENCOUNTER — HOSPITAL ENCOUNTER (EMERGENCY)
Facility: HOSPITAL | Age: 23
Discharge: HOME | End: 2023-07-09
Attending: EMERGENCY MEDICINE
Payer: COMMERCIAL

## 2023-07-09 VITALS
BODY MASS INDEX: 28.93 KG/M2 | RESPIRATION RATE: 18 BRPM | TEMPERATURE: 98 F | DIASTOLIC BLOOD PRESSURE: 70 MMHG | HEIGHT: 66 IN | OXYGEN SATURATION: 97 % | WEIGHT: 180 LBS | HEART RATE: 80 BPM | SYSTOLIC BLOOD PRESSURE: 139 MMHG

## 2023-07-09 DIAGNOSIS — J02.0 STREP PHARYNGITIS: Primary | ICD-10-CM

## 2023-07-09 LAB — S PYO DNA THROAT QL NAA+PROBE: DETECTED

## 2023-07-09 PROCEDURE — 87651 STREP A DNA AMP PROBE: CPT | Performed by: PHYSICIAN ASSISTANT

## 2023-07-09 PROCEDURE — 99282 EMERGENCY DEPT VISIT SF MDM: CPT

## 2023-07-09 RX ORDER — AMOXICILLIN 500 MG/1
500 CAPSULE ORAL EVERY 12 HOURS SCHEDULED
Qty: 19 CAPSULE | Refills: 0 | Status: SHIPPED | OUTPATIENT
Start: 2023-07-09 | End: 2023-07-19

## 2023-07-09 RX ORDER — AMOXICILLIN 250 MG/1
500 CAPSULE ORAL ONCE
Status: COMPLETED | OUTPATIENT
Start: 2023-07-09 | End: 2023-07-09

## 2023-07-09 RX ADMIN — AMOXICILLIN 500 MG: 250 CAPSULE ORAL at 19:27

## 2023-07-09 RX ADMIN — DEXAMETHASONE SODIUM PHOSPHATE 10 MG: 10 INJECTION, SOLUTION INTRAMUSCULAR; INTRAVENOUS at 18:49

## 2023-07-09 NOTE — DISCHARGE INSTRUCTIONS
Take antibiotics as prescribed. Take Tylenol and ibuprofen for pain. Use honey and lozenges for your sore throat. Change your toothbrush in 3-4 days. Return to the ER with any worsening symptoms, drooling, inability to swallow.

## 2023-07-09 NOTE — ED PROVIDER NOTES
History  Chief Complaint   Patient presents with   • Sore Throat     Patient c/o sore throat x 3 days. 23yo female with a history of asthma and seasonal allergies presenting with her mother for evaluation of a sore throat x 1 week. She reports a generalized sore throat that has been gradually worsening. She is having pain with swallowing but no dysphagia. She has tried tea and OTC medications without much relief. No fevers, chills, congestion, ear pain, voice change. No known sick contacts. History provided by:  Patient   used: No        Prior to Admission Medications   Prescriptions Last Dose Informant Patient Reported? Taking? albuterol (PROVENTIL HFA,VENTOLIN HFA) 90 mcg/act inhaler  Self Yes No   Sig: Inhale 2 puffs every 6 (six) hours as needed for wheezing   cetirizine (ZyrTEC) 10 mg tablet  Self No No   Sig: Take 1 tablet (10 mg total) by mouth daily For nasal congestion   naproxen (NAPROSYN) 500 mg tablet  Self Yes No   Sig: Take 500 mg by mouth 2 (two) times a day      Facility-Administered Medications: None       Past Medical History:   Diagnosis Date   • Allergic rhinitis     Last assessed: 11/11/14   • Asthma     Intrinsic       Past Surgical History:   Procedure Laterality Date   • NO PAST SURGERIES         Family History   Problem Relation Age of Onset   • Asthma Mother    • Hypertension Mother    • Migraines Mother    • Asthma Father    • Hypertension Father    • Glaucoma Maternal Grandmother    • Aneurysm Maternal Grandmother    • Cataracts Maternal Grandmother         Acquired   • Hypothyroidism Maternal Aunt    • Addiction problem Neg Hx    • Mental illness Neg Hx      I have reviewed and agree with the history as documented.     E-Cigarette/Vaping   • E-Cigarette Use Never User      E-Cigarette/Vaping Substances   • Nicotine No    • THC No    • CBD No    • Flavoring No    • Other No    • Unknown No      Social History     Tobacco Use   • Smoking status: Never   • Smokeless tobacco: Never   Vaping Use   • Vaping Use: Never used   Substance Use Topics   • Alcohol use: No   • Drug use: No       Review of Systems   Constitutional: Negative for chills and fever. HENT: Positive for sore throat. Negative for drooling and voice change. Eyes: Negative for discharge and redness. Respiratory: Negative for cough and stridor. Gastrointestinal: Negative for diarrhea and vomiting. Skin: Negative for color change and rash. All other systems reviewed and are negative. Physical Exam  Physical Exam  Vitals and nursing note reviewed. Constitutional:       General: She is not in acute distress. Appearance: She is well-developed. She is not diaphoretic. HENT:      Head: Normocephalic and atraumatic. Right Ear: External ear normal.      Left Ear: External ear normal.      Nose: Nose normal.      Mouth/Throat:      Mouth: Mucous membranes are moist.      Pharynx: Uvula midline. Posterior oropharyngeal erythema present. Tonsils: Tonsillar exudate present. 2+ on the right. 2+ on the left. Eyes:      General: No scleral icterus. Right eye: No discharge. Left eye: No discharge. Conjunctiva/sclera: Conjunctivae normal.   Cardiovascular:      Rate and Rhythm: Normal rate and regular rhythm. Heart sounds: Normal heart sounds. No murmur heard. Pulmonary:      Effort: Pulmonary effort is normal. No respiratory distress. Breath sounds: Normal breath sounds. No stridor. No wheezing or rales. Musculoskeletal:         General: No deformity. Normal range of motion. Cervical back: Normal range of motion and neck supple. No rigidity. Lymphadenopathy:      Cervical: Cervical adenopathy present. Skin:     General: Skin is warm and dry. Neurological:      Mental Status: She is alert. She is not disoriented. GCS: GCS eye subscore is 4. GCS verbal subscore is 5. GCS motor subscore is 6.    Psychiatric:         Behavior: Behavior normal.         Vital Signs  ED Triage Vitals [07/09/23 1822]   Temperature Pulse Respirations Blood Pressure SpO2   98 °F (36.7 °C) 80 18 139/70 97 %      Temp Source Heart Rate Source Patient Position - Orthostatic VS BP Location FiO2 (%)   Tympanic Monitor Sitting Left arm --      Pain Score       --           Vitals:    07/09/23 1822   BP: 139/70   Pulse: 80   Patient Position - Orthostatic VS: Sitting         Visual Acuity      ED Medications  Medications   dexamethasone oral liquid 10 mg 1 mL (10 mg Oral Given 7/9/23 1849)   amoxicillin (AMOXIL) capsule 500 mg (500 mg Oral Given 7/9/23 1927)       Diagnostic Studies  Results Reviewed     Procedure Component Value Units Date/Time    Strep A PCR [[id number]]  (Abnormal) Collected: 07/09/23 1848    Lab Status: Final result Specimen: Throat Updated: 07/09/23 1918     STREP A PCR Detected                 No orders to display              Procedures  Procedures         ED Course  ED Course as of 07/10/23 1110   Sun Jul 09, 2023 1920 STREP A PCR(!): Detected         SBIRT 20yo+    Flowsheet Row Most Recent Value   Initial Alcohol Screen: US AUDIT-C     1. How often do you have a drink containing alcohol? 0 Filed at: 07/09/2023 1823   2. How many drinks containing alcohol do you have on a typical day you are drinking? 0 Filed at: 07/09/2023 1823   3b. FEMALE Any Age, or MALE 65+: How often do you have 4 or more drinks on one occassion? 0 Filed at: 07/09/2023 1823   Audit-C Score 0 Filed at: 07/09/2023 1823   CHARITY: How many times in the past year have you. .. Used an illegal drug or used a prescription medication for non-medical reasons? Never Filed at: 07/09/2023 1823                    Medical Decision Making  23yoF presenting for a sore throat x 1 week. She is afebrile and well appearing. Airway patent on exam. Phonation is normal and there is no trismus. Bilateral tonsillar enlargement, erythema, and exudates present.  No clinical signs of PTA, RPA, or epiglottitis. Strep PCR obtained which is positive. She was started on a course of amoxicillin. Dose of Decadron also given. Supportive care discussed. Advised close PCP follow-up. ED return precautions discussed. Patient expressed understanding and is agreeable to plan. Patient discharged in stable condition. Strep pharyngitis: acute illness or injury  Amount and/or Complexity of Data Reviewed  Labs: ordered. Decision-making details documented in ED Course. Risk  Prescription drug management. Disposition  Final diagnoses:   Strep pharyngitis     Time reflects when diagnosis was documented in both MDM as applicable and the Disposition within this note     Time User Action Codes Description Comment    7/9/2023  7:21 PM [address], [address] [J02.0] Strep pharyngitis       ED Disposition     ED Disposition   Discharge    Condition   Stable    Date/Time   Sun Jul 9, 2023  7:21 PM    Comment   [place] discharge to home/self care.                Follow-up Information     Follow up With Specialties Details Why Contact Info Additional Information    Oseas Reina MD Family Medicine Schedule an appointment as soon as possible for a visit   [address] [place] [address] Emergency Department Emergency Medicine  If symptoms worsen [address] [facility] [address] [facility] Emergency Department, [place], [place], [address]          Discharge Medication List as of 7/9/2023  7:25 PM      START taking these medications    Details   amoxicillin (AMOXIL) 500 mg capsule Take 1 capsule (500 mg total) by mouth every 12 (twelve) hours for 10 days, Starting Sun 7/9/2023, Until Wed 7/19/2023, Normal         CONTINUE these medications which have NOT CHANGED    Details   albuterol (PROVENTIL HFA,VENTOLIN HFA) 90 mcg/act inhaler Inhale 2 puffs every 6 (six) hours as needed for wheezing, Historical Med      cetirizine (ZyrTEC) 10 mg tablet Take 1 tablet (10 mg total) by mouth daily For nasal congestion, Starting Thu 5/10/2018, Print      naproxen (NAPROSYN) 500 mg tablet Take 500 mg by mouth 2 (two) times a day, Historical Med             No discharge procedures on file.     PDMP Review     None          ED Provider  Electronically Signed by           Samara Ellington PA-C  07/10/23 3882